# Patient Record
Sex: MALE | Race: WHITE | URBAN - METROPOLITAN AREA
[De-identification: names, ages, dates, MRNs, and addresses within clinical notes are randomized per-mention and may not be internally consistent; named-entity substitution may affect disease eponyms.]

---

## 2020-03-06 ENCOUNTER — OFFICE VISIT (OUTPATIENT)
Dept: URBAN - METROPOLITAN AREA CLINIC 29 | Facility: CLINIC | Age: 60
End: 2020-03-06
Payer: COMMERCIAL

## 2020-03-06 DIAGNOSIS — H25.013 CORTICAL AGE-RELATED CATARACT, BILATERAL: Primary | ICD-10-CM

## 2020-03-06 PROCEDURE — 92004 COMPRE OPH EXAM NEW PT 1/>: CPT | Performed by: OPTOMETRIST

## 2020-03-06 ASSESSMENT — INTRAOCULAR PRESSURE
OS: 12
OD: 13

## 2020-03-06 NOTE — IMPRESSION/PLAN
Impression: Cortical age-related cataract, bilateral: H25.013. OU. Plan: Discussed diagnosis in detail with patient. Discussed treatment options with patient. Consult recommended [Cataract Specialist]. Discussed IOL lens options.

## 2020-05-06 ENCOUNTER — OFFICE VISIT (OUTPATIENT)
Dept: URBAN - METROPOLITAN AREA CLINIC 29 | Facility: CLINIC | Age: 60
End: 2020-05-06
Payer: COMMERCIAL

## 2020-05-06 DIAGNOSIS — H25.813 COMBINED FORMS OF AGE-RELATED CATARACT, BILATERAL: Primary | ICD-10-CM

## 2020-05-06 PROCEDURE — 99204 OFFICE O/P NEW MOD 45 MIN: CPT | Performed by: OPHTHALMOLOGY

## 2020-05-06 RX ORDER — OFLOXACIN 3 MG/ML
0.3 % SOLUTION/ DROPS OPHTHALMIC
Qty: 5 | Refills: 1 | Status: INACTIVE
Start: 2020-05-06 | End: 2020-05-14

## 2020-05-06 RX ORDER — PREDNISOLONE ACETATE 10 MG/ML
1 % SUSPENSION/ DROPS OPHTHALMIC
Qty: 10 | Refills: 1 | Status: INACTIVE
Start: 2020-05-06 | End: 2020-06-09

## 2020-05-06 ASSESSMENT — INTRAOCULAR PRESSURE
OD: 12
OS: 12

## 2020-05-06 ASSESSMENT — VISUAL ACUITY
OS: 20/40
OD: 20/40